# Patient Record
Sex: FEMALE | Race: BLACK OR AFRICAN AMERICAN
[De-identification: names, ages, dates, MRNs, and addresses within clinical notes are randomized per-mention and may not be internally consistent; named-entity substitution may affect disease eponyms.]

---

## 2017-02-16 ENCOUNTER — HOSPITAL ENCOUNTER (EMERGENCY)
Dept: HOSPITAL 54 - ER | Age: 27
Discharge: HOME | End: 2017-02-16
Payer: COMMERCIAL

## 2017-02-16 VITALS — DIASTOLIC BLOOD PRESSURE: 89 MMHG | SYSTOLIC BLOOD PRESSURE: 138 MMHG

## 2017-02-16 VITALS — WEIGHT: 190 LBS | HEIGHT: 67 IN | BODY MASS INDEX: 29.82 KG/M2

## 2017-02-16 DIAGNOSIS — L03.116: Primary | ICD-10-CM

## 2017-02-16 PROCEDURE — A4606 OXYGEN PROBE USED W OXIMETER: HCPCS

## 2017-02-16 PROCEDURE — Z7610: HCPCS

## 2017-02-16 NOTE — NUR
PT PRESENTED TO THE ER WITH A C/O LLE/ INNER THIGH REDNESS, PAIN. PT STATED 
THAT SHE THINKS SHE HAD A MOSQUITO BITE AND IT TURNED INTO A LARGE RED AREA. 
AREA IS WARM TO TOUCH.

## 2017-02-16 NOTE — NUR
Patient discharged to home in stable condition. Written and verbal after care 
instructions given. Patient verbalizes understanding of instruction AND RX. PT 
AMBULATED OUT WITH  A STEADY GAIT. VSS

## 2018-02-09 ENCOUNTER — HOSPITAL ENCOUNTER (EMERGENCY)
Dept: HOSPITAL 54 - ER | Age: 28
Discharge: HOME | End: 2018-02-09
Payer: COMMERCIAL

## 2018-02-09 VITALS — BODY MASS INDEX: 25.83 KG/M2 | HEIGHT: 65 IN | WEIGHT: 155 LBS

## 2018-02-09 VITALS — DIASTOLIC BLOOD PRESSURE: 72 MMHG | SYSTOLIC BLOOD PRESSURE: 108 MMHG

## 2018-02-09 DIAGNOSIS — J06.9: ICD-10-CM

## 2018-02-09 DIAGNOSIS — J32.9: ICD-10-CM

## 2018-02-09 DIAGNOSIS — H61.23: Primary | ICD-10-CM

## 2018-02-09 PROCEDURE — Z7610: HCPCS

## 2018-02-09 PROCEDURE — A4606 OXYGEN PROBE USED W OXIMETER: HCPCS

## 2019-03-10 ENCOUNTER — HOSPITAL ENCOUNTER (EMERGENCY)
Dept: HOSPITAL 54 - ER | Age: 29
Discharge: HOME | End: 2019-03-10
Payer: COMMERCIAL

## 2019-03-10 VITALS — HEIGHT: 64 IN | BODY MASS INDEX: 26.29 KG/M2 | WEIGHT: 154 LBS

## 2019-03-10 VITALS — DIASTOLIC BLOOD PRESSURE: 77 MMHG | SYSTOLIC BLOOD PRESSURE: 112 MMHG

## 2019-03-10 DIAGNOSIS — J06.9: Primary | ICD-10-CM

## 2019-03-10 DIAGNOSIS — L04.0: ICD-10-CM

## 2019-03-10 DIAGNOSIS — R51: ICD-10-CM

## 2019-03-10 PROCEDURE — 99283 EMERGENCY DEPT VISIT LOW MDM: CPT

## 2019-12-15 ENCOUNTER — HOSPITAL ENCOUNTER (EMERGENCY)
Dept: HOSPITAL 54 - ER | Age: 29
Discharge: HOME | End: 2019-12-15
Payer: COMMERCIAL

## 2019-12-15 VITALS — HEIGHT: 63 IN | BODY MASS INDEX: 26.58 KG/M2 | WEIGHT: 150 LBS

## 2019-12-15 VITALS — DIASTOLIC BLOOD PRESSURE: 70 MMHG | SYSTOLIC BLOOD PRESSURE: 102 MMHG

## 2019-12-15 DIAGNOSIS — R09.89: Primary | ICD-10-CM

## 2019-12-15 PROCEDURE — 96374 THER/PROPH/DIAG INJ IV PUSH: CPT

## 2019-12-15 PROCEDURE — 99283 EMERGENCY DEPT VISIT LOW MDM: CPT

## 2019-12-15 PROCEDURE — 71045 X-RAY EXAM CHEST 1 VIEW: CPT

## 2019-12-15 PROCEDURE — 96375 TX/PRO/DX INJ NEW DRUG ADDON: CPT

## 2019-12-15 NOTE — NUR
Patient discharged to home in stable condition. Written and verbal after care 
instructions given. Patient verbalizes understanding of instruction. IV removed 
and secured with gauze and tape. ID band removed. vs stable. No s/s of acute 
distress or sob noted. Pt left on foot with steady gait.